# Patient Record
Sex: FEMALE | Race: WHITE | Employment: UNEMPLOYED | URBAN - NONMETROPOLITAN AREA
[De-identification: names, ages, dates, MRNs, and addresses within clinical notes are randomized per-mention and may not be internally consistent; named-entity substitution may affect disease eponyms.]

---

## 2020-02-28 ENCOUNTER — TRANSCRIBE ORDERS (OUTPATIENT)
Dept: PODIATRY | Facility: CLINIC | Age: 70
End: 2020-02-28

## 2020-02-28 DIAGNOSIS — M21.611 BUNION OF RIGHT FOOT: Primary | ICD-10-CM

## 2020-09-25 ENCOUNTER — OFFICE VISIT (OUTPATIENT)
Dept: PODIATRY | Facility: CLINIC | Age: 70
End: 2020-09-25

## 2020-09-25 VITALS — HEART RATE: 84 BPM | OXYGEN SATURATION: 95 % | HEIGHT: 62 IN | WEIGHT: 178 LBS | BODY MASS INDEX: 32.76 KG/M2

## 2020-09-25 DIAGNOSIS — M21.611 BUNION, RIGHT FOOT: Primary | ICD-10-CM

## 2020-09-25 DIAGNOSIS — G89.29 CHRONIC TOE PAIN, BILATERAL: ICD-10-CM

## 2020-09-25 DIAGNOSIS — M79.675 CHRONIC TOE PAIN, BILATERAL: ICD-10-CM

## 2020-09-25 DIAGNOSIS — M20.5X1 OVERRIDING TOE OF RIGHT FOOT: ICD-10-CM

## 2020-09-25 DIAGNOSIS — M79.674 CHRONIC TOE PAIN, BILATERAL: ICD-10-CM

## 2020-09-25 DIAGNOSIS — B35.1 ONYCHOMYCOSIS: ICD-10-CM

## 2020-09-25 PROCEDURE — 99203 OFFICE O/P NEW LOW 30 MIN: CPT | Performed by: PODIATRIST

## 2020-09-25 PROCEDURE — 11721 DEBRIDE NAIL 6 OR MORE: CPT | Performed by: PODIATRIST

## 2020-09-25 RX ORDER — CIPROFLOXACIN 500 MG/1
TABLET, FILM COATED ORAL
COMMUNITY

## 2020-09-25 RX ORDER — FUROSEMIDE 20 MG/1
TABLET ORAL
COMMUNITY
Start: 2020-07-01 | End: 2020-09-25

## 2020-09-25 RX ORDER — DULOXETIN HYDROCHLORIDE 30 MG/1
CAPSULE, DELAYED RELEASE ORAL
COMMUNITY
End: 2021-09-14

## 2020-09-25 RX ORDER — FAMOTIDINE 20 MG/1
TABLET, FILM COATED ORAL
COMMUNITY

## 2020-09-25 RX ORDER — GUAIFENESIN 600 MG/1
600 TABLET, EXTENDED RELEASE ORAL
COMMUNITY

## 2020-09-25 RX ORDER — RIVASTIGMINE TARTRATE 6 MG/1
CAPSULE ORAL EVERY 12 HOURS SCHEDULED
COMMUNITY

## 2020-09-25 RX ORDER — PNEUMOCOCCAL 13-VALENT CONJUGATE VACCINE 2.2; 2.2; 2.2; 2.2; 2.2; 4.4; 2.2; 2.2; 2.2; 2.2; 2.2; 2.2; 2.2 UG/.5ML; UG/.5ML; UG/.5ML; UG/.5ML; UG/.5ML; UG/.5ML; UG/.5ML; UG/.5ML; UG/.5ML; UG/.5ML; UG/.5ML; UG/.5ML; UG/.5ML
INJECTION, SUSPENSION INTRAMUSCULAR
COMMUNITY

## 2020-09-25 RX ORDER — FUROSEMIDE 20 MG/1
TABLET ORAL
COMMUNITY
End: 2021-12-21

## 2020-09-25 RX ORDER — TRAZODONE HYDROCHLORIDE 50 MG/1
TABLET ORAL
COMMUNITY
End: 2020-09-25

## 2020-09-25 RX ORDER — CARVEDILOL 3.12 MG/1
TABLET ORAL EVERY 12 HOURS SCHEDULED
COMMUNITY

## 2020-09-25 RX ORDER — INFLUENZA A VIRUS A/SINGAPORE/GP1908/2015 IVR-180A (H1N1) ANTIGEN (PROPIOLACTONE INACTIVATED), INFLUENZA A VIRUS A/SINGAPORE/INFIMH-16-0019/2016 IVR-186 (H3N2) ANTIGEN (PROPIOLACTONE INACTIVATED), INFLUENZA B VIRUS B/MARYLAND/15/2016 ANTIGEN (PROPIOLACTONE INACTIVATED), AND INFLUENZA B VIRUS B/PHUKET/3073/2013 BVR-1B ANTIGEN (PROPIOLACTONE INACTIVATED) 15; 15; 15; 15 UG/.5ML; UG/.5ML; UG/.5ML; UG/.5ML
INJECTION, SUSPENSION INTRAMUSCULAR
COMMUNITY

## 2020-09-25 RX ORDER — ESCITALOPRAM OXALATE 10 MG/1
TABLET ORAL
COMMUNITY
End: 2020-09-25

## 2020-09-25 RX ORDER — CLONIDINE HYDROCHLORIDE 0.1 MG/1
TABLET ORAL
COMMUNITY

## 2020-09-25 RX ORDER — HYDROCODONE BITARTRATE AND ACETAMINOPHEN 5; 325 MG/1; MG/1
TABLET ORAL EVERY 6 HOURS
COMMUNITY

## 2020-09-25 RX ORDER — LOSARTAN POTASSIUM 25 MG/1
TABLET ORAL
COMMUNITY

## 2020-09-25 RX ORDER — VENLAFAXINE HYDROCHLORIDE 150 MG/1
CAPSULE, EXTENDED RELEASE ORAL
COMMUNITY
End: 2020-09-25

## 2020-09-25 RX ORDER — NADOLOL 20 MG/1
TABLET ORAL
COMMUNITY
End: 2020-09-25

## 2020-09-25 RX ORDER — QUETIAPINE FUMARATE 50 MG/1
TABLET, FILM COATED ORAL
COMMUNITY

## 2020-09-25 NOTE — PROGRESS NOTES
Enid Zheng  1950  70 y.o. female     Patient came with sister for concern of bilateral bunions and foot care    09/25/2020    Chief Complaint   Patient presents with   • Right Foot - Bunions   • Left Foot - Bunions       History of Present Illness    Enid Zheng is a 70 y.o.female who presents to clinic today accompanied by her sister.  Patient is a resident at [facility].  Patient sister is the primary historian.  Primary concern today is for toe deformities on the right foot.  There is associated swelling to the second toe.  This is progressively gotten worse over the years.  There are no associated injuries.  They are interested in discussing options available for treatment.  Also complains of long, thickened painful toenails.  Patient is unable to trim her own toenails.  There are no other lower extremity complaints today.    Past Medical History:   Diagnosis Date   • Bunion    • High blood pressure          Past Surgical History:   Procedure Laterality Date   • ELBOW ARTHROPLASTY     • GALLBLADDER SURGERY           Family History   Problem Relation Age of Onset   • Heart disease Mother    • Hypertension Mother    • Heart disease Father    • Diabetes Father    • Cancer Maternal Aunt    • Diabetes Maternal Aunt        No Known Allergies    Social History     Socioeconomic History   • Marital status: Single     Spouse name: Not on file   • Number of children: Not on file   • Years of education: Not on file   • Highest education level: Not on file         Current Outpatient Medications   Medication Sig Dispense Refill   • carvedilol (Coreg) 3.125 MG tablet Every 12 (Twelve) Hours.     • ciprofloxacin (CIPRO) 500 MG tablet ciprofloxacin 500 mg tablet     • cloNIDine (CATAPRES) 0.1 MG tablet clonidine HCl 0.1 mg tablet   Take 1 tablet every day by oral route for 30 days.     • DULoxetine (Cymbalta) 30 MG capsule Cymbalta 30 mg capsule,delayed release   Take 1 capsule every day by oral route for  "30 days.     • famotidine (PEPCID) 20 MG tablet famotidine 20 mg tablet     • furosemide (LASIX) 20 MG tablet furosemide 20 mg tablet   TAKE ONE TABLET BY MOUTH EVERY DAY     • guaiFENesin (MUCINEX) 600 MG 12 hr tablet Take 600 mg by mouth.     • HYDROcodone-acetaminophen (Norco) 5-325 MG per tablet Every 6 (Six) Hours.     • influenza vac split quad (Afluria Quadrivalent) 0.5 ML suspension prefilled syringe injection Afluria Qd 2019-20 (36 mos up)(PF)60 mcg (15 mcg x4)/0.5 mL IM syringe     • losartan (COZAAR) 25 MG tablet losartan 25 mg tablet   TAKE ONE TABLET(S) BY MOUTH EVERY DAY.     • pneumococcal conj. 13-valent (Prevnar 13) vaccine Prevnar 13 (PF) 0.5 mL intramuscular syringe     • QUEtiapine (SEROquel) 50 MG tablet quetiapine 50 mg tablet     • rivastigmine (EXELON) 6 MG capsule Every 12 (Twelve) Hours.       No current facility-administered medications for this visit.        Review of Systems   Unable to perform ROS: Psychiatric disorder         OBJECTIVE    Pulse 84   Ht 157.5 cm (62\")   Wt 80.7 kg (178 lb)   SpO2 95%   BMI 32.56 kg/m²       Physical Exam  Vitals signs reviewed.   Constitutional:       General: She is not in acute distress.     Appearance: She is well-developed. She is not ill-appearing.   HENT:      Head: Normocephalic and atraumatic.      Nose: Nose normal.   Eyes:      Conjunctiva/sclera: Conjunctivae normal.      Pupils: Pupils are equal, round, and reactive to light.   Pulmonary:      Effort: Pulmonary effort is normal. No respiratory distress.      Breath sounds: No wheezing.   Musculoskeletal: Normal range of motion.         General: No deformity.   Skin:     General: Skin is warm and dry.      Capillary Refill: Capillary refill takes less than 2 seconds.   Neurological:      Mental Status: She is alert and oriented to person, place, and time.         Lower Extremity    Cardiovascular:    DP/PT pulses palpable bilateral  CFT brisk  to all digits  Skin temp is warm to warm from " proximal tibia to distal digits bilateral  Musculoskeletal:  Muscle strength is 5/5 for all muscle groups tested   Severe HAV deformity right.  Overriding second digit on the right.  Dermatological:   Skin is warm, dry and intact bilateral  Webspaces 1-4 bilateral are clean, dry and intact.   No subcutaneous nodules or masses noted    Nails 1-5 bilateral are thickened, discolored, elongated with subungual debris.  Pain on palpation to the nail plates.  Neurological:   Protective sensation intact   Sensation intact to light touch        Procedures        ASSESSMENT AND PLAN    Enid was seen today for bunions and bunions.    Diagnoses and all orders for this visit:    Bunion, right foot  -     XR Foot 3+ View Right    Overriding toe of right foot    Onychomycosis    Chronic toe pain, bilateral      - Comprehensive foot and ankle exam performed.   -Radiographs taken and reviewed right foot.  No acute issues.  -Lengthy discussion held regarding treatment options going forward if needed.  Recommended wide toe box shoes with mesh upper.  Dispensed gel toe sleeve.  - Nails 1-5 bilateral were debrided in length and thickness with nail nipper and electric  to decrease fungal load and risk of infection.   - All questions were answered to the patients satisfaction.  - RTC as needed           This document has been electronically signed by Gregorio Farooq DPM on September 27, 2020 15:32 CDT     9/27/2020  15:32 CDT

## 2021-03-02 ENCOUNTER — OFFICE VISIT (OUTPATIENT)
Dept: PODIATRY | Facility: CLINIC | Age: 71
End: 2021-03-02

## 2021-03-02 VITALS — OXYGEN SATURATION: 99 % | WEIGHT: 178 LBS | BODY MASS INDEX: 32.76 KG/M2 | HEART RATE: 64 BPM | HEIGHT: 62 IN

## 2021-03-02 DIAGNOSIS — B35.1 ONYCHOMYCOSIS: Primary | ICD-10-CM

## 2021-03-02 DIAGNOSIS — M79.674 CHRONIC TOE PAIN, BILATERAL: ICD-10-CM

## 2021-03-02 DIAGNOSIS — M79.675 CHRONIC TOE PAIN, BILATERAL: ICD-10-CM

## 2021-03-02 DIAGNOSIS — G89.29 CHRONIC TOE PAIN, BILATERAL: ICD-10-CM

## 2021-03-02 PROCEDURE — 11721 DEBRIDE NAIL 6 OR MORE: CPT | Performed by: PODIATRIST

## 2021-03-02 NOTE — PROGRESS NOTES
Enid Zheng  1950  70 y.o. female   ADEN Barlow - 2/5/2021    Patient presents today for non-diabetic foot care.    03/02/2021     Chief Complaint   Patient presents with   • Left Foot - non-diabetic foot care   • Right Foot - non-diabetic foot care       History of Present Illness    Enid Zheng is a 70 y.o.female who presents to clinic today accompanied by her sister for routine foot care.     Past Medical History:   Diagnosis Date   • Bunion    • High blood pressure          Past Surgical History:   Procedure Laterality Date   • ELBOW ARTHROPLASTY     • GALLBLADDER SURGERY           Family History   Problem Relation Age of Onset   • Heart disease Mother    • Hypertension Mother    • Heart disease Father    • Diabetes Father    • Cancer Maternal Aunt    • Diabetes Maternal Aunt        No Known Allergies    Social History     Socioeconomic History   • Marital status: Single     Spouse name: Not on file   • Number of children: Not on file   • Years of education: Not on file   • Highest education level: Not on file   Tobacco Use   • Smoking status: Never Smoker   • Smokeless tobacco: Never Used   Substance and Sexual Activity   • Alcohol use: Never     Frequency: Never   • Drug use: Never   • Sexual activity: Never         Current Outpatient Medications   Medication Sig Dispense Refill   • carvedilol (Coreg) 3.125 MG tablet Every 12 (Twelve) Hours.     • ciprofloxacin (CIPRO) 500 MG tablet ciprofloxacin 500 mg tablet     • cloNIDine (CATAPRES) 0.1 MG tablet clonidine HCl 0.1 mg tablet   Take 1 tablet every day by oral route for 30 days.     • DULoxetine (Cymbalta) 30 MG capsule Cymbalta 30 mg capsule,delayed release   Take 1 capsule every day by oral route for 30 days.     • famotidine (PEPCID) 20 MG tablet famotidine 20 mg tablet     • furosemide (LASIX) 20 MG tablet furosemide 20 mg tablet   TAKE ONE TABLET BY MOUTH EVERY DAY     • guaiFENesin (MUCINEX) 600 MG 12 hr tablet Take 600 mg by  "mouth.     • HYDROcodone-acetaminophen (Norco) 5-325 MG per tablet Every 6 (Six) Hours.     • influenza vac split quad (Afluria Quadrivalent) 0.5 ML suspension prefilled syringe injection Afluria Qd 2019-20 (36 mos up)(PF)60 mcg (15 mcg x4)/0.5 mL IM syringe     • losartan (COZAAR) 25 MG tablet losartan 25 mg tablet   TAKE ONE TABLET(S) BY MOUTH EVERY DAY.     • pneumococcal conj. 13-valent (Prevnar 13) vaccine Prevnar 13 (PF) 0.5 mL intramuscular syringe     • QUEtiapine (SEROquel) 50 MG tablet quetiapine 50 mg tablet     • rivastigmine (EXELON) 6 MG capsule Every 12 (Twelve) Hours.       No current facility-administered medications for this visit.        Review of Systems   Unable to perform ROS: Patient nonverbal         OBJECTIVE    Pulse 64   Ht 157.5 cm (62\")   Wt 80.7 kg (178 lb)   SpO2 99%   BMI 32.56 kg/m²       Physical Exam  Vitals signs reviewed.   Constitutional:       General: She is not in acute distress.     Appearance: She is well-developed. She is not ill-appearing.   HENT:      Head: Normocephalic and atraumatic.      Nose: Nose normal.   Eyes:      Conjunctiva/sclera: Conjunctivae normal.      Pupils: Pupils are equal, round, and reactive to light.   Pulmonary:      Effort: Pulmonary effort is normal. No respiratory distress.      Breath sounds: No wheezing.   Musculoskeletal: Normal range of motion.         General: No deformity.   Skin:     General: Skin is warm and dry.      Capillary Refill: Capillary refill takes less than 2 seconds.   Neurological:      Mental Status: She is alert and oriented to person, place, and time.         Lower Extremity    Cardiovascular:    DP/PT pulses palpable bilateral  CFT brisk  to all digits  Skin temp is warm to warm from proximal tibia to distal digits bilateral  Musculoskeletal:  Muscle strength is 5/5 for all muscle groups tested   Severe HAV deformity right.  Overriding second digit on the right.  Dermatological:   Skin is warm, dry and intact " bilateral  Webspaces 1-4 bilateral are clean, dry and intact.   No subcutaneous nodules or masses noted    Nails 1-5 bilateral are thickened, discolored, elongated with subungual debris.  Pain on palpation to the nail plates.  Neurological:   Protective sensation intact   Sensation intact to light touch        Procedures        ASSESSMENT AND PLAN    Diagnoses and all orders for this visit:    1. Onychomycosis (Primary)    2. Chronic toe pain, bilateral      - Nails 1-5 bilateral were debrided in length and thickness with nail nipper and electric  to decrease fungal load and risk of infection.   - All questions were answered to the patients satisfaction.  - RTC as needed           This document has been electronically signed by Gregorio Farooq DPM on March 2, 2021 13:58 CST     3/2/2021  13:58 CST

## 2021-06-08 ENCOUNTER — OFFICE VISIT (OUTPATIENT)
Dept: PODIATRY | Facility: CLINIC | Age: 71
End: 2021-06-08

## 2021-06-08 VITALS — WEIGHT: 178 LBS | HEIGHT: 62 IN | BODY MASS INDEX: 32.76 KG/M2 | OXYGEN SATURATION: 93 % | HEART RATE: 84 BPM

## 2021-06-08 DIAGNOSIS — M79.675 CHRONIC TOE PAIN, BILATERAL: ICD-10-CM

## 2021-06-08 DIAGNOSIS — M79.674 CHRONIC TOE PAIN, BILATERAL: ICD-10-CM

## 2021-06-08 DIAGNOSIS — B35.1 ONYCHOMYCOSIS: Primary | ICD-10-CM

## 2021-06-08 DIAGNOSIS — G89.29 CHRONIC TOE PAIN, BILATERAL: ICD-10-CM

## 2021-06-08 PROCEDURE — 11721 DEBRIDE NAIL 6 OR MORE: CPT | Performed by: PODIATRIST

## 2021-06-08 NOTE — PROGRESS NOTES
Enid Zheng  1950  71 y.o. female   ADEN Barlow - 2/5/2021 06/08/2021     Chief Complaint   Patient presents with   • Left Foot - non-diabetic foot care   • Right Foot - non-diabetic foot care       History of Present Illness    Enid Zheng is a 71 y.o.female who presents to clinic today accompanied by her sister for routine foot care.     Past Medical History:   Diagnosis Date   • Bunion    • High blood pressure    • Onychomycosis          Past Surgical History:   Procedure Laterality Date   • ELBOW ARTHROPLASTY     • GALLBLADDER SURGERY           Family History   Problem Relation Age of Onset   • Heart disease Mother    • Hypertension Mother    • Heart disease Father    • Diabetes Father    • Cancer Maternal Aunt    • Diabetes Maternal Aunt        No Known Allergies    Social History     Socioeconomic History   • Marital status: Single     Spouse name: Not on file   • Number of children: Not on file   • Years of education: Not on file   • Highest education level: Not on file   Tobacco Use   • Smoking status: Never Smoker   • Smokeless tobacco: Never Used   Vaping Use   • Vaping Use: Never used   Substance and Sexual Activity   • Alcohol use: Never   • Drug use: Never   • Sexual activity: Never         Current Outpatient Medications   Medication Sig Dispense Refill   • carvedilol (Coreg) 3.125 MG tablet Every 12 (Twelve) Hours.     • cloNIDine (CATAPRES) 0.1 MG tablet clonidine HCl 0.1 mg tablet   Take 1 tablet every day by oral route for 30 days.     • DULoxetine (Cymbalta) 30 MG capsule Cymbalta 30 mg capsule,delayed release   Take 1 capsule every day by oral route for 30 days.     • famotidine (PEPCID) 20 MG tablet famotidine 20 mg tablet     • furosemide (LASIX) 20 MG tablet furosemide 20 mg tablet   TAKE ONE TABLET BY MOUTH EVERY DAY     • guaiFENesin (MUCINEX) 600 MG 12 hr tablet Take 600 mg by mouth.     • HYDROcodone-acetaminophen (Norco) 5-325 MG per tablet Every 6 (Six) Hours.    "  • influenza vac split quad (Afluria Quadrivalent) 0.5 ML suspension prefilled syringe injection Afluria Qd 2019-20 (36 mos up)(PF)60 mcg (15 mcg x4)/0.5 mL IM syringe     • losartan (COZAAR) 25 MG tablet losartan 25 mg tablet   TAKE ONE TABLET(S) BY MOUTH EVERY DAY.     • pneumococcal conj. 13-valent (Prevnar 13) vaccine Prevnar 13 (PF) 0.5 mL intramuscular syringe     • QUEtiapine (SEROquel) 50 MG tablet quetiapine 50 mg tablet     • rivastigmine (EXELON) 6 MG capsule Every 12 (Twelve) Hours.     • ciprofloxacin (CIPRO) 500 MG tablet ciprofloxacin 500 mg tablet       No current facility-administered medications for this visit.       Review of Systems   Constitutional: Negative.    HENT: Negative.    Cardiovascular: Negative.    Gastrointestinal: Negative.    Musculoskeletal:        Toe discomfort         OBJECTIVE    Pulse 84   Ht 157.5 cm (62\")   Wt 80.7 kg (178 lb)   SpO2 93%   BMI 32.56 kg/m²       Physical Exam  Vitals reviewed.   Constitutional:       General: She is not in acute distress.     Appearance: She is well-developed. She is not ill-appearing.   HENT:      Head: Normocephalic and atraumatic.      Nose: Nose normal.   Eyes:      Conjunctiva/sclera: Conjunctivae normal.      Pupils: Pupils are equal, round, and reactive to light.   Pulmonary:      Effort: Pulmonary effort is normal. No respiratory distress.      Breath sounds: No wheezing.   Musculoskeletal:         General: No deformity. Normal range of motion.   Skin:     General: Skin is warm and dry.      Capillary Refill: Capillary refill takes less than 2 seconds.   Neurological:      Mental Status: She is alert and oriented to person, place, and time.         Lower Extremity    Cardiovascular:    DP/PT pulses palpable bilateral  CFT brisk  to all digits  Skin temp is warm to warm from proximal tibia to distal digits bilateral  Musculoskeletal:  Muscle strength is 5/5 for all muscle groups tested   Severe HAV deformity right.  Overriding " second digit on the right.  Dermatological:   Skin is warm, dry and intact bilateral  Webspaces 1-4 bilateral are clean, dry and intact.   No subcutaneous nodules or masses noted    Nails 1-5 bilateral are thickened, discolored, elongated with subungual debris.  Pain on palpation to the nail plates.  Neurological:   Protective sensation intact   Sensation intact to light touch        Procedures        ASSESSMENT AND PLAN    Diagnoses and all orders for this visit:    1. Onychomycosis (Primary)    2. Chronic toe pain, bilateral      - Nails 1-5 bilateral were debrided in length and thickness with nail nipper and electric  to decrease fungal load and risk of infection.   - All questions were answered to the patients satisfaction.  - RTC as needed           This document has been electronically signed by Gregorio Farooq DPM on June 8, 2021 10:45 CDT     6/8/2021  10:45 CDT

## 2021-09-14 ENCOUNTER — OFFICE VISIT (OUTPATIENT)
Dept: PODIATRY | Facility: CLINIC | Age: 71
End: 2021-09-14

## 2021-09-14 VITALS — HEIGHT: 62 IN | OXYGEN SATURATION: 94 % | WEIGHT: 178 LBS | HEART RATE: 81 BPM | BODY MASS INDEX: 32.76 KG/M2

## 2021-09-14 DIAGNOSIS — G89.29 CHRONIC TOE PAIN, BILATERAL: ICD-10-CM

## 2021-09-14 DIAGNOSIS — B35.1 ONYCHOMYCOSIS: Primary | ICD-10-CM

## 2021-09-14 DIAGNOSIS — M79.674 CHRONIC TOE PAIN, BILATERAL: ICD-10-CM

## 2021-09-14 DIAGNOSIS — M79.675 CHRONIC TOE PAIN, BILATERAL: ICD-10-CM

## 2021-09-14 PROCEDURE — 11721 DEBRIDE NAIL 6 OR MORE: CPT | Performed by: PODIATRIST

## 2021-09-14 RX ORDER — ESCITALOPRAM OXALATE 10 MG/1
TABLET ORAL
COMMUNITY
Start: 2021-08-10

## 2021-09-14 NOTE — PROGRESS NOTES
Enid Zheng  1950  71 y.o. female   ADEN LairdLauren - 2/5/2021    Non diabetic foot care     09/14/2021     Chief Complaint   Patient presents with   • Left Foot - non diabetic foot care   • Right Foot - non diabetic foot care       History of Present Illness    Enid Zheng is a 71 y.o.female who presents to clinic today accompanied by her sister for routine foot care.     Past Medical History:   Diagnosis Date   • Bunion    • High blood pressure    • Onychomycosis          Past Surgical History:   Procedure Laterality Date   • ELBOW ARTHROPLASTY     • GALLBLADDER SURGERY           Family History   Problem Relation Age of Onset   • Heart disease Mother    • Hypertension Mother    • Heart disease Father    • Diabetes Father    • Cancer Maternal Aunt    • Diabetes Maternal Aunt        No Known Allergies    Social History     Socioeconomic History   • Marital status: Single     Spouse name: Not on file   • Number of children: Not on file   • Years of education: Not on file   • Highest education level: Not on file   Tobacco Use   • Smoking status: Never Smoker   • Smokeless tobacco: Never Used   Vaping Use   • Vaping Use: Never used   Substance and Sexual Activity   • Alcohol use: Never   • Drug use: Never   • Sexual activity: Never         Current Outpatient Medications   Medication Sig Dispense Refill   • carvedilol (Coreg) 3.125 MG tablet Every 12 (Twelve) Hours.     • ciprofloxacin (CIPRO) 500 MG tablet ciprofloxacin 500 mg tablet     • cloNIDine (CATAPRES) 0.1 MG tablet clonidine HCl 0.1 mg tablet   Take 1 tablet every day by oral route for 30 days.     • escitalopram (LEXAPRO) 10 MG tablet      • famotidine (PEPCID) 20 MG tablet famotidine 20 mg tablet     • furosemide (LASIX) 20 MG tablet furosemide 20 mg tablet   TAKE ONE TABLET BY MOUTH EVERY DAY     • guaiFENesin (MUCINEX) 600 MG 12 hr tablet Take 600 mg by mouth.     • HYDROcodone-acetaminophen (Norco) 5-325 MG per tablet Every 6 (Six)  "Hours.     • influenza vac split quad (Afluria Quadrivalent) 0.5 ML suspension prefilled syringe injection Afluria Qd 2019-20 (36 mos up)(PF)60 mcg (15 mcg x4)/0.5 mL IM syringe     • losartan (COZAAR) 25 MG tablet losartan 25 mg tablet   TAKE ONE TABLET(S) BY MOUTH EVERY DAY.     • pneumococcal conj. 13-valent (Prevnar 13) vaccine Prevnar 13 (PF) 0.5 mL intramuscular syringe     • QUEtiapine (SEROquel) 50 MG tablet quetiapine 50 mg tablet     • rivastigmine (EXELON) 6 MG capsule Every 12 (Twelve) Hours.       No current facility-administered medications for this visit.       Review of Systems   Constitutional: Negative.    HENT: Negative.    Cardiovascular: Negative.    Gastrointestinal: Negative.    Musculoskeletal:        Toe discomfort         OBJECTIVE    Pulse 81   Ht 157.5 cm (62\")   Wt 80.7 kg (178 lb)   SpO2 94%   BMI 32.56 kg/m²       Physical Exam  Vitals reviewed.   Constitutional:       General: She is not in acute distress.     Appearance: She is well-developed. She is not ill-appearing.   HENT:      Head: Normocephalic and atraumatic.      Nose: Nose normal.   Eyes:      Conjunctiva/sclera: Conjunctivae normal.      Pupils: Pupils are equal, round, and reactive to light.   Pulmonary:      Effort: Pulmonary effort is normal. No respiratory distress.      Breath sounds: No wheezing.   Musculoskeletal:         General: No deformity. Normal range of motion.   Skin:     General: Skin is warm and dry.      Capillary Refill: Capillary refill takes less than 2 seconds.   Neurological:      Mental Status: She is alert and oriented to person, place, and time.         Lower Extremity    Cardiovascular:    DP/PT pulses palpable bilateral  CFT brisk  to all digits  Skin temp is warm to warm from proximal tibia to distal digits bilateral  Musculoskeletal:  Muscle strength is 5/5 for all muscle groups tested   Severe HAV deformity right.  Overriding second digit on the right.  Dermatological:   Skin is warm, dry " and intact bilateral  Webspaces 1-4 bilateral are clean, dry and intact.   No subcutaneous nodules or masses noted    Nails 1-5 bilateral are thickened, discolored, elongated with subungual debris.  Pain on palpation to the nail plates.  Neurological:   Protective sensation intact   Sensation intact to light touch        Procedures        ASSESSMENT AND PLAN    Diagnoses and all orders for this visit:    1. Onychomycosis (Primary)    2. Chronic toe pain, bilateral      - Nails 1-5 bilateral were debrided in length and thickness with nail nipper and electric  to decrease fungal load and risk of infection.   - All questions were answered to the patients satisfaction.  - RTC as needed           This document has been electronically signed by Gregorio Farooq DPM on September 14, 2021 12:38 CDT     9/14/2021  12:38 CDT

## 2021-12-21 ENCOUNTER — OFFICE VISIT (OUTPATIENT)
Dept: PODIATRY | Facility: CLINIC | Age: 71
End: 2021-12-21

## 2021-12-21 VITALS — OXYGEN SATURATION: 94 % | WEIGHT: 178 LBS | HEART RATE: 89 BPM | HEIGHT: 62 IN | BODY MASS INDEX: 32.76 KG/M2

## 2021-12-21 DIAGNOSIS — B35.1 ONYCHOMYCOSIS: Primary | ICD-10-CM

## 2021-12-21 DIAGNOSIS — G89.29 CHRONIC TOE PAIN, BILATERAL: ICD-10-CM

## 2021-12-21 DIAGNOSIS — M79.674 CHRONIC TOE PAIN, BILATERAL: ICD-10-CM

## 2021-12-21 DIAGNOSIS — M79.675 CHRONIC TOE PAIN, BILATERAL: ICD-10-CM

## 2021-12-21 PROCEDURE — 11721 DEBRIDE NAIL 6 OR MORE: CPT | Performed by: PODIATRIST

## 2021-12-21 RX ORDER — FUROSEMIDE 40 MG/1
TABLET ORAL
COMMUNITY

## 2021-12-21 RX ORDER — ESTRADIOL 0.1 MG/G
CREAM VAGINAL
COMMUNITY
Start: 2021-09-14

## 2021-12-21 RX ORDER — DOCUSATE SODIUM 100 MG/1
CAPSULE, LIQUID FILLED ORAL
COMMUNITY

## 2021-12-21 NOTE — PROGRESS NOTES
Enid Zheng  1950  71 y.o. female   ADEN LairdLauren - 11/17/2021    Non diabetic foot care     12/21/2021     Chief Complaint   Patient presents with   • Left Foot - non diabetic foot care   • Right Foot - non diabetic foot care       History of Present Illness    Enid Zheng is a 71 y.o.female who presents to clinic today accompanied by her sister for routine foot care.     Past Medical History:   Diagnosis Date   • Bunion    • High blood pressure    • Onychomycosis          Past Surgical History:   Procedure Laterality Date   • ELBOW ARTHROPLASTY     • GALLBLADDER SURGERY           Family History   Problem Relation Age of Onset   • Heart disease Mother    • Hypertension Mother    • Heart disease Father    • Diabetes Father    • Cancer Maternal Aunt    • Diabetes Maternal Aunt        No Known Allergies    Social History     Socioeconomic History   • Marital status: Single   Tobacco Use   • Smoking status: Never Smoker   • Smokeless tobacco: Never Used   Vaping Use   • Vaping Use: Never used   Substance and Sexual Activity   • Alcohol use: Never   • Drug use: Never   • Sexual activity: Never         Current Outpatient Medications   Medication Sig Dispense Refill   • Calcium Carbonate (Caltrate 600) 1500 (600 Ca) MG tablet Every 12 (Twelve) Hours.     • carvedilol (Coreg) 3.125 MG tablet Every 12 (Twelve) Hours.     • ciprofloxacin (CIPRO) 500 MG tablet ciprofloxacin 500 mg tablet     • cloNIDine (CATAPRES) 0.1 MG tablet clonidine HCl 0.1 mg tablet   Take 1 tablet every day by oral route for 30 days.     • docusate sodium (Colace) 100 MG capsule Colace 100 mg capsule   Take 1 capsule every day by oral route.     • escitalopram (LEXAPRO) 10 MG tablet      • estradiol (ESTRACE) 0.1 MG/GM vaginal cream      • famotidine (PEPCID) 20 MG tablet famotidine 20 mg tablet     • furosemide (LASIX) 40 MG tablet furosemide 40 mg tablet   Take 1 tablet every day by oral route.     • guaiFENesin (MUCINEX) 600 MG  "12 hr tablet Take 600 mg by mouth.     • HYDROcodone-acetaminophen (Norco) 5-325 MG per tablet Every 6 (Six) Hours.     • influenza vac split quad (Afluria Quadrivalent) 0.5 ML suspension prefilled syringe injection Afluria Qd 2019-20 (36 mos up)(PF)60 mcg (15 mcg x4)/0.5 mL IM syringe     • losartan (COZAAR) 25 MG tablet losartan 25 mg tablet   TAKE ONE TABLET(S) BY MOUTH EVERY DAY.     • pneumococcal conj. 13-valent (Prevnar 13) vaccine Prevnar 13 (PF) 0.5 mL intramuscular syringe     • QUEtiapine (SEROquel) 50 MG tablet quetiapine 50 mg tablet     • rivastigmine (EXELON) 6 MG capsule Every 12 (Twelve) Hours.       No current facility-administered medications for this visit.       Review of Systems   Constitutional: Negative.    HENT: Negative.    Cardiovascular: Negative.    Gastrointestinal: Negative.    Musculoskeletal:        Toe discomfort         OBJECTIVE    Pulse 89   Ht 157.5 cm (62\")   Wt 80.7 kg (178 lb)   SpO2 94%   BMI 32.56 kg/m²       Physical Exam  Vitals reviewed.   Constitutional:       General: She is not in acute distress.     Appearance: She is well-developed. She is not ill-appearing.   HENT:      Head: Normocephalic and atraumatic.      Nose: Nose normal.   Eyes:      Conjunctiva/sclera: Conjunctivae normal.      Pupils: Pupils are equal, round, and reactive to light.   Pulmonary:      Effort: Pulmonary effort is normal. No respiratory distress.      Breath sounds: No wheezing.   Musculoskeletal:         General: No deformity. Normal range of motion.   Skin:     General: Skin is warm and dry.      Capillary Refill: Capillary refill takes less than 2 seconds.   Neurological:      Mental Status: She is alert and oriented to person, place, and time.         Lower Extremity    Cardiovascular:    DP/PT pulses palpable bilateral  CFT brisk  to all digits  Skin temp is warm to warm from proximal tibia to distal digits bilateral  Musculoskeletal:  Muscle strength is 5/5 for all muscle groups " tested   Severe HAV deformity right.  Overriding second digit on the right.  Dermatological:   Skin is warm, dry and intact bilateral  Webspaces 1-4 bilateral are clean, dry and intact.   No subcutaneous nodules or masses noted    Nails 1-5 bilateral are thickened, discolored, elongated with subungual debris.  Pain on palpation to the nail plates.  Neurological:   Protective sensation intact   Sensation intact to light touch        Procedures        ASSESSMENT AND PLAN    Diagnoses and all orders for this visit:    1. Onychomycosis (Primary)    2. Chronic toe pain, bilateral      - Nails 1-5 bilateral were debrided in length and thickness with nail nipper and electric  to decrease fungal load and risk of infection.  Patient signed ABN prior to nail debridement.  - All questions were answered to the patients satisfaction.  - RTC as needed           This document has been electronically signed by Gregorio Farooq DPM on December 21, 2021 14:32 CST     12/21/2021  14:32 CST

## 2022-04-26 ENCOUNTER — OFFICE VISIT (OUTPATIENT)
Dept: PODIATRY | Facility: CLINIC | Age: 72
End: 2022-04-26

## 2022-04-26 VITALS — HEART RATE: 89 BPM | OXYGEN SATURATION: 99 % | HEIGHT: 62 IN | WEIGHT: 178 LBS | BODY MASS INDEX: 32.76 KG/M2

## 2022-04-26 DIAGNOSIS — G89.29 CHRONIC TOE PAIN, BILATERAL: ICD-10-CM

## 2022-04-26 DIAGNOSIS — M79.674 CHRONIC TOE PAIN, BILATERAL: ICD-10-CM

## 2022-04-26 DIAGNOSIS — B35.1 ONYCHOMYCOSIS: Primary | ICD-10-CM

## 2022-04-26 DIAGNOSIS — M79.675 CHRONIC TOE PAIN, BILATERAL: ICD-10-CM

## 2022-04-26 PROCEDURE — 11721 DEBRIDE NAIL 6 OR MORE: CPT | Performed by: PODIATRIST

## 2022-04-26 NOTE — PROGRESS NOTES
Enid Zheng  1950  71 y.o. female   ADEN Barlow - 11/17/2021    Non diabetic foot care     04/26/2022      Chief Complaint   Patient presents with   • Left Foot - Follow-up     nonDiabetic foot care   • Right Foot - Follow-up     nonDiabetic foot care       History of Present Illness    Enid Zheng is a 71 y.o.female who presents to clinic today accompanied by her sister for routine foot care.     Past Medical History:   Diagnosis Date   • Bunion    • High blood pressure    • Onychomycosis          Past Surgical History:   Procedure Laterality Date   • ELBOW ARTHROPLASTY     • GALLBLADDER SURGERY           Family History   Problem Relation Age of Onset   • Heart disease Mother    • Hypertension Mother    • Heart disease Father    • Diabetes Father    • Cancer Maternal Aunt    • Diabetes Maternal Aunt        No Known Allergies    Social History     Socioeconomic History   • Marital status: Single   Tobacco Use   • Smoking status: Never Smoker   • Smokeless tobacco: Never Used   Vaping Use   • Vaping Use: Never used   Substance and Sexual Activity   • Alcohol use: Never   • Drug use: Never   • Sexual activity: Never         Current Outpatient Medications   Medication Sig Dispense Refill   • Calcium Carbonate 1500 (600 Ca) MG tablet Every 12 (Twelve) Hours.     • carvedilol (COREG) 3.125 MG tablet Every 12 (Twelve) Hours.     • ciprofloxacin (CIPRO) 500 MG tablet ciprofloxacin 500 mg tablet     • cloNIDine (CATAPRES) 0.1 MG tablet clonidine HCl 0.1 mg tablet   Take 1 tablet every day by oral route for 30 days.     • docusate sodium (COLACE) 100 MG capsule Colace 100 mg capsule   Take 1 capsule every day by oral route.     • escitalopram (LEXAPRO) 10 MG tablet      • estradiol (ESTRACE) 0.1 MG/GM vaginal cream      • famotidine (PEPCID) 20 MG tablet famotidine 20 mg tablet     • furosemide (LASIX) 40 MG tablet furosemide 40 mg tablet   Take 1 tablet every day by oral route.     • guaiFENesin  "(MUCINEX) 600 MG 12 hr tablet Take 600 mg by mouth.     • HYDROcodone-acetaminophen (NORCO) 5-325 MG per tablet Every 6 (Six) Hours.     • influenza vac split quad (Afluria Quadrivalent) 0.5 ML suspension prefilled syringe injection Afluria Qd 2019-20 (36 mos up)(PF)60 mcg (15 mcg x4)/0.5 mL IM syringe     • losartan (COZAAR) 25 MG tablet losartan 25 mg tablet   TAKE ONE TABLET(S) BY MOUTH EVERY DAY.     • pneumococcal conj. 13-valent (Prevnar 13) vaccine Prevnar 13 (PF) 0.5 mL intramuscular syringe     • QUEtiapine (SEROquel) 50 MG tablet quetiapine 50 mg tablet     • rivastigmine (EXELON) 6 MG capsule Every 12 (Twelve) Hours.       No current facility-administered medications for this visit.       Review of Systems   Constitutional: Negative.    HENT: Negative.    Cardiovascular: Negative.    Gastrointestinal: Negative.    Musculoskeletal:        Toe discomfort         OBJECTIVE    Pulse 89   Ht 157.5 cm (62\")   Wt 80.7 kg (178 lb)   SpO2 99%   BMI 32.56 kg/m²       Physical Exam  Vitals reviewed.   Constitutional:       General: She is not in acute distress.     Appearance: She is well-developed. She is not ill-appearing.   HENT:      Head: Normocephalic and atraumatic.      Nose: Nose normal.   Eyes:      Conjunctiva/sclera: Conjunctivae normal.      Pupils: Pupils are equal, round, and reactive to light.   Pulmonary:      Effort: Pulmonary effort is normal. No respiratory distress.      Breath sounds: No wheezing.   Musculoskeletal:         General: No deformity. Normal range of motion.   Skin:     General: Skin is warm and dry.      Capillary Refill: Capillary refill takes less than 2 seconds.   Neurological:      Mental Status: She is alert and oriented to person, place, and time.         Lower Extremity    Cardiovascular:    DP/PT pulses palpable bilateral  CFT brisk  to all digits  Skin temp is warm to warm from proximal tibia to distal digits bilateral  Musculoskeletal:  Muscle strength is 5/5 for all " muscle groups tested   Severe HAV deformity right.  Overriding second digit on the right.  Dermatological:   Skin is warm, dry and intact bilateral  Webspaces 1-4 bilateral are clean, dry and intact.   No subcutaneous nodules or masses noted    Nails 1-5 bilateral are thickened, discolored, elongated with subungual debris.  Pain on palpation to the nail plates.  Neurological:   Protective sensation intact   Sensation intact to light touch        Procedures        ASSESSMENT AND PLAN    Diagnoses and all orders for this visit:    1. Onychomycosis (Primary)    2. Chronic toe pain, bilateral      - Nails 1-5 bilateral were debrided in length and thickness with nail nipper and electric  to decrease fungal load and risk of infection.  Patient signed ABN prior to nail debridement.  - All questions were answered to the patients satisfaction.  - RTC as needed           This document has been electronically signed by Gregorio Farooq DPM on April 26, 2022 13:38 CDT     4/26/2022  13:38 CDT